# Patient Record
Sex: MALE | Race: WHITE
[De-identification: names, ages, dates, MRNs, and addresses within clinical notes are randomized per-mention and may not be internally consistent; named-entity substitution may affect disease eponyms.]

---

## 2018-05-25 ENCOUNTER — HOSPITAL ENCOUNTER (EMERGENCY)
Dept: HOSPITAL 45 - C.EDC | Age: 68
Discharge: ANOTHER HOSPITAL | End: 2018-05-25
Payer: COMMERCIAL

## 2018-05-25 VITALS — TEMPERATURE: 97.88 F

## 2018-05-25 VITALS — OXYGEN SATURATION: 97 %

## 2018-05-25 VITALS
HEIGHT: 70.98 IN | HEIGHT: 70.98 IN | BODY MASS INDEX: 25.96 KG/M2 | WEIGHT: 185.41 LBS | WEIGHT: 185.41 LBS | BODY MASS INDEX: 25.96 KG/M2

## 2018-05-25 VITALS — SYSTOLIC BLOOD PRESSURE: 190 MMHG | DIASTOLIC BLOOD PRESSURE: 96 MMHG | HEART RATE: 90 BPM | OXYGEN SATURATION: 100 %

## 2018-05-25 DIAGNOSIS — I72.6: Primary | ICD-10-CM

## 2018-05-25 DIAGNOSIS — G20: ICD-10-CM

## 2018-05-25 DIAGNOSIS — R55: ICD-10-CM

## 2018-05-25 DIAGNOSIS — R03.0: ICD-10-CM

## 2018-05-25 DIAGNOSIS — M79.671: ICD-10-CM

## 2018-05-25 LAB
ALBUMIN SERPL-MCNC: 3.6 GM/DL (ref 3.4–5)
ALP SERPL-CCNC: 68 U/L (ref 45–117)
ALT SERPL-CCNC: 12 U/L (ref 12–78)
AST SERPL-CCNC: 21 U/L (ref 15–37)
BASOPHILS # BLD: 0.01 K/UL (ref 0–0.2)
BASOPHILS NFR BLD: 0.2 %
BUN SERPL-MCNC: 17 MG/DL (ref 7–18)
CALCIUM SERPL-MCNC: 8.4 MG/DL (ref 8.5–10.1)
CK MB SERPL-MCNC: 2.6 NG/ML (ref 0.5–3.6)
CO2 SERPL-SCNC: 27 MMOL/L (ref 21–32)
CREAT SERPL-MCNC: 1.33 MG/DL (ref 0.6–1.4)
EOS ABS #: 0.09 K/UL (ref 0–0.5)
EOSINOPHIL NFR BLD AUTO: 188 K/UL (ref 130–400)
GLUCOSE SERPL-MCNC: 91 MG/DL (ref 70–99)
HCT VFR BLD CALC: 42.2 % (ref 42–52)
HGB BLD-MCNC: 14.7 G/DL (ref 14–18)
IG#: 0.01 K/UL (ref 0–0.02)
IMM GRANULOCYTES NFR BLD AUTO: 21.4 %
INR PPP: 1 (ref 0.9–1.1)
LIPASE: 138 U/L (ref 73–393)
LYMPHOCYTES # BLD: 1.04 K/UL (ref 1.2–3.4)
MCH RBC QN AUTO: 31.7 PG (ref 25–34)
MCHC RBC AUTO-ENTMCNC: 34.8 G/DL (ref 32–36)
MCV RBC AUTO: 90.9 FL (ref 80–100)
MONO ABS #: 0.55 K/UL (ref 0.11–0.59)
MONOCYTES NFR BLD: 11.3 %
NEUT ABS #: 3.16 K/UL (ref 1.4–6.5)
NEUTROPHILS # BLD AUTO: 1.9 %
NEUTROPHILS NFR BLD AUTO: 65 %
PMV BLD AUTO: 9.7 FL (ref 7.4–10.4)
POTASSIUM SERPL-SCNC: 3.4 MMOL/L (ref 3.5–5.1)
PROT SERPL-MCNC: 6.7 GM/DL (ref 6.4–8.2)
PTT PATIENT: 21.8 SECONDS (ref 21–31)
RED CELL DISTRIBUTION WIDTH CV: 13.1 % (ref 11.5–14.5)
RED CELL DISTRIBUTION WIDTH SD: 43.4 FL (ref 36.4–46.3)
SODIUM SERPL-SCNC: 141 MMOL/L (ref 136–145)
WBC # BLD AUTO: 4.86 K/UL (ref 4.8–10.8)

## 2018-05-25 NOTE — DIAGNOSTIC IMAGING REPORT
SINGLE VIEW CHEST



CLINICAL HISTORY:  Atypical chest pain.



FINDINGS: 2 AP, portable, upright chest radiographs are obtained. No prior

studies are available for comparison at the time of dictation. The examination

is degraded by portable technique and patient rotation.  The cardiomediastinal

silhouette is unremarkable. There is mild atherosclerotic calcification of the

thoracic aorta. There is elevation of left hemidiaphragm. No airspace

consolidation or pleural effusion is identified. No pneumothorax is seen. The

bony thorax is grossly intact.



IMPRESSION: No acute cardiopulmonary abnormality.







Electronically signed by:  Jesus Morgan M.D.

5/25/2018 3:51 PM



Dictated Date/Time:  5/25/2018 3:50 PM

## 2018-05-25 NOTE — DIAGNOSTIC IMAGING REPORT
CT OF THE HEAD WITHOUT CONTRAST



CLINICAL HISTORY: Syncope.    



COMPARISON STUDY:  No previous studies for comparison. 



TECHNIQUE: Helical axial images of the head were obtained without IV contrast.

Automated exposure control was utilized for the study.  A dose lowering

technique was utilized adhering to the principles of ALARA.





FINDINGS: No acute intracranial hemorrhage, midline shift or mass effect is

present. Note is made of a 1.6 x 1.4 cm round hyperdensity within the left

inferior posterior fossa, adjacent to or extending into the left foramen of

Luschka. This has minimal peripheral calcification. There are no findings to

suggest acute dural sinus thrombosis or acute territorial infarct. Ventricular

system is normal. There are no extra axial collections. There is no calvarial

fracture. Minimal mucosal thickening of the sinuses is noted.



IMPRESSION:  1.6 x 1.4 cm round hyperdensity within the left inferior posterior

fossa, adjacent to or extending into the left foramen of Luschka. A meningioma

is favored. An aneurysm could appear similar although is considered less likely.

A CTA of the head is recommended. Findings discussed with Dr. Ignacio at time of

dictation.







Electronically signed by:  Ronnie Middleton M.D.

5/25/2018 4:26 PM



Dictated Date/Time:  5/25/2018 4:18 PM

## 2018-05-25 NOTE — EMERGENCY ROOM VISIT NOTE
History


Report prepared by Jazmyne:  Татьяна Ramirez


Under the Supervision of:  Dr. Edson Ignacio M.D.


First contact with patient:  15:09


Chief Complaint:  SYNCOPE (NEAR SYNCOPE)


Stated Complaint:  SYNCOPE/CONFUSION


Nursing Triage Summary:  


[place] [address] for syncope with drooling, slumped over, 


then became unresponsive, CPR started by family, [facility] arrived on scene and 


confirmed pt not in cardiac arrest. Pt alert and oriented at this time, no 


neurological deficits noted, vitals stable, family at bedside.





History of Present Illness


The patient is a 67 year old male who presents to the Emergency Room with 

complaints of an episode of syncope 30-45 minutes ago. The patient is traveling 

with his family. They left this morning. He was a passenger in the car. He 

complained of nausea and then became unresponsive. He was clammy and 

diaphoretic. His eyes were rolled back and he was drooling. He did not appear 

to be seizing. The patient does not remember feeling sick. He has never had 

this before. His family member thought that he was not breathing and started 

CPR for 5-10 minutes before police arrived. He presents to the ED by EMS. He 

currently just feels tired. He denies any headache, chest pain, SOB, abdominal 

pain, black or bloody stool, incontinence, blurry vision, or tongue bite. He 

has been eating and drinking well. He has a history of Parkinson's. He denies 

any history of heart problems, seizure, or diabetes. He is not on blood 

thinners.





   Source of History:  patient, family


   Onset:  30-45 minutes ago


   Position:  head


   Quality:  other (syncope)


   Timing:  other (episodic)


   Associated Symptoms:  + diaphoresis, + nausea, + fatigue, No headache, No 

chest pain, No SOB, No abdominal pain, No melena, No hematochezia





Review of Systems


See HPI for pertinent positives & negatives. A total of 10 systems reviewed and 

were otherwise negative.





Past Medical & Surgical


Medical Problems:


(1) Parkinson's disease





Old medical records were attempted to be reviewed but there are no old records 

at this hospital. Nurse's notes were reviewed and I agree with.





Family History


No pertinent family history stated.





Social History


Smoking Status:  Never Smoker


Marital Status:  


Housing Status:  lives with family


Occupation Status:  retired





Current/Historical Medications


Scheduled


Aspirin (Aspirin Ec), 81 MG PO QAM


Carbidopa/Levodopa (Sinemet 25MG/250MG), 1 TAB PO TID


Coenzyme Q10 (Ubidecarenone) (Co Q10), 1 CAP PO DAILY


Ranitidine Hcl (Zantac), 300 MG PO HS


Rasagiline Mesylate (Azilect), 1 MG PO QAM





Allergies


Coded Allergies:  


     No Known Allergies (Unverified , 5/25/18)





Physical Exam


Vital Signs











  Date Time  Temp Pulse Resp B/P (MAP) Pulse Ox O2 Delivery O2 Flow Rate FiO2


 


5/25/18 18:54  90 20 139/89 99 Room Air  


 


5/25/18 17:06  93 14 148/87 99 Room Air  


 


5/25/18 15:09  82      


 


5/25/18 15:03 36.6 75 16 145/89 97 Room Air  


 


5/25/18 14:57     97 Room Air  











Physical Exam


General: Non-ill appearing older male in no acute distress, alert and oriented 

x3, answers all questions appropriately. 


HEENT: Normal cephalic atraumatic.  Pupils are equal round and reactive to 

light.  Extraocular movements are intact.  Oropharynx is pink with moist mucous 

membranes.  No swelling of the mouth lips or tongue.


Neck: Supple with a midline trachea.  No meningeal signs or stiffness, no JVD 

or bruits. No Stridor.


Chest: Clear to auscultation bilaterally.  No wheezes or rhonchi.  No increased 

work of breathing.


Heart: regular rate and rhythm. 


Abdomen: Soft nontender, nondistended without rebound guarding or rigidity.  


Extremities: No cyanosis clubbing or edema. No calf tenderness or assymetry


Spine/Back. Non tender to palpation. No CVA tenderness


Skin: Good turgor without rashes.


Neurologic exam: Cranial nerves two through 12 are intact.  Mild tremor from 

baseline Parkinson's. Otherwise, motor and sensation are intact and symmetrical 

throughout.





Medical Decision & Procedures


ER Provider


Diagnostic Interpretation:


X-ray results as stated below per interpretation by me and the radiologist. 

Radiology results as stated below per my review and radiologist interpretation:





SINGLE VIEW CHEST





CLINICAL HISTORY:  Atypical chest pain.





FINDINGS: 2 AP, portable, upright chest radiographs are obtained. No prior


studies are available for comparison at the time of dictation. The examination


is degraded by portable technique and patient rotation.  The cardiomediastinal


silhouette is unremarkable. There is mild atherosclerotic calcification of the


thoracic aorta. There is elevation of left hemidiaphragm. No airspace


consolidation or pleural effusion is identified. No pneumothorax is seen. The


bony thorax is grossly intact.





IMPRESSION: No acute cardiopulmonary abnormality.





Electronically signed by:  Jesus Morgan M.D.


5/25/2018 3:51 PM





Dictated Date/Time:  5/25/2018 3:50 PM








R FOOT MIN 3 VIEWS ROUTINE





CLINICAL HISTORY: Lateral right foot pain.    





COMPARISON: None





FINDINGS:  Tarsometatarsal joints are intact. No acute fracture is identified.


Incidental note is made of a bipartite lateral sesamoid of the right great toe.


Soft tissue swelling along the lateral aspect of the right fifth


metatarsophalangeal joint is noted. No acute fractures identified. There is mild


osteoarthritis within the right first metatarsophalangeal joint.





IMPRESSION: 





1. No acute fracture or dislocation within the right foot.





2. Soft tissue swelling along the lateral aspect of the right fifth


metatarsophalangeal joint.





Electronically signed by:  Ronnie Middleton M.D.


5/25/2018 5:39 PM





Dictated Date/Time:  5/25/2018 5:36 PM








CT OF THE HEAD WITHOUT CONTRAST





CLINICAL HISTORY: Syncope.    





COMPARISON STUDY:  No previous studies for comparison. 





TECHNIQUE: Helical axial images of the head were obtained without IV contrast.


Automated exposure control was utilized for the study.  A dose lowering


technique was utilized adhering to the principles of ALARA.








FINDINGS: No acute intracranial hemorrhage, midline shift or mass effect is


present. Note is made of a 1.6 x 1.4 cm round hyperdensity within the left


inferior posterior fossa, adjacent to or extending into the left foramen of


Luschka. This has minimal peripheral calcification. There are no findings to


suggest acute dural sinus thrombosis or acute territorial infarct. Ventricular


system is normal. There are no extra axial collections. There is no calvarial


fracture. Minimal mucosal thickening of the sinuses is noted.





IMPRESSION:  1.6 x 1.4 cm round hyperdensity within the left inferior posterior


fossa, adjacent to or extending into the left foramen of Luschka. A meningioma


is favored. An aneurysm could appear similar although is considered less likely.


A CTA of the head is recommended. Findings discussed with Dr. Ignacio at time of


dictation.





Electronically signed by:  Ronnie Middleton M.D.


5/25/2018 4:26 PM





Dictated Date/Time:  5/25/2018 4:18 PM








CTA ANGIOGRAPHY OF THE HEAD





CLINICAL HISTORY: Syncope. Posterior fossa abnormality on CT.    





COMPARISON STUDY:  Head CT May 25, 2018 at 4:17 PM.





TECHNIQUE:  Helical axial images of the head were obtained following uneventful


intravenous administration of 116 cc of Optiray 320.  A dose lowering technique


was utilized adhering to the principles of ALARA.








CT DOSE: 122.00 mGy.cm





FINDINGS: No acute intracranial hemorrhage, midline shift or mass effect is


present. Ventricular system is normal. The basilar cisterns are patent. There


are no extra-axial collections. The bilateral M1, M2, A1 and A2 segments are


patent. Note is made of a 1.6 x 1.4 cm round density within the left inferior


posterior fossa which arises from the intracranial portion of the left vertebral


artery, just distal to the takeoff of the posterior inferior cerebellar artery


which corresponds to the finding shown on prior contrast head CT. This


demonstrates minimal filling and represents a partially thrombosed aneurysm


which extends into or adjacent to the left foramen of Luschka. The periphery of


this aneurysm fills with contrast. The remainder of the aneurysm is thrombosed.


No additional intracranial aneurysms are identified. The posterior circulation


is otherwise unremarkable. There is mild intracranial vascular calcification.





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


portion of the left vertebral artery, immediately distal to the takeoff of the


left PICA. The periphery of this aneurysm fills with contrast while the majority


of the aneurysm is thrombosed. No subarachnoid hemorrhage. No additional


intracranial aneurysms. Neurosurgical consultation is recommended. Findings


discussed with Dr. Ignacio at time of dictation.





Electronically signed by:  Ronnie Middleton M.D.


5/25/2018 5:20 PM





Dictated Date/Time:  5/25/2018 5:01 PM





Laboratory Results


5/25/18 14:28








Red Blood Count 4.64, Mean Corpuscular Volume 90.9, Mean Corpuscular Hemoglobin 

31.7, Mean Corpuscular Hemoglobin Concent 34.8, Mean Platelet Volume 9.7, 

Neutrophils (%) (Auto) 65.0, Lymphocytes (%) (Auto) 21.4, Monocytes (%) (Auto) 

11.3, Eosinophils (%) (Auto) 1.9, Basophils (%) (Auto) 0.2, Neutrophils # (Auto

) 3.16, Lymphocytes # (Auto) 1.04, Monocytes # (Auto) 0.55, Eosinophils # (Auto

) 0.09, Basophils # (Auto) 0.01





5/25/18 14:28

















Test


  5/25/18


14:28 5/25/18


15:21 5/25/18


15:36


 


White Blood Count


  4.86 K/uL


(4.8-10.8) 


  


 


 


Red Blood Count


  4.64 M/uL


(4.7-6.1) 


  


 


 


Hemoglobin


  14.7 g/dL


(14.0-18.0) 


  


 


 


Hematocrit 42.2 % (42-52)   


 


Mean Corpuscular Volume


  90.9 fL


() 


  


 


 


Mean Corpuscular Hemoglobin


  31.7 pg


(25-34) 


  


 


 


Mean Corpuscular Hemoglobin


Concent 34.8 g/dl


(32-36) 


  


 


 


Platelet Count


  188 K/uL


(130-400) 


  


 


 


Mean Platelet Volume


  9.7 fL


(7.4-10.4) 


  


 


 


Neutrophils (%) (Auto) 65.0 %   


 


Lymphocytes (%) (Auto) 21.4 %   


 


Monocytes (%) (Auto) 11.3 %   


 


Eosinophils (%) (Auto) 1.9 %   


 


Basophils (%) (Auto) 0.2 %   


 


Neutrophils # (Auto)


  3.16 K/uL


(1.4-6.5) 


  


 


 


Lymphocytes # (Auto)


  1.04 K/uL


(1.2-3.4) 


  


 


 


Monocytes # (Auto)


  0.55 K/uL


(0.11-0.59) 


  


 


 


Eosinophils # (Auto)


  0.09 K/uL


(0-0.5) 


  


 


 


Basophils # (Auto)


  0.01 K/uL


(0-0.2) 


  


 


 


RDW Standard Deviation


  43.4 fL


(36.4-46.3) 


  


 


 


RDW Coefficient of Variation


  13.1 %


(11.5-14.5) 


  


 


 


Immature Granulocyte % (Auto) 0.2 %   


 


Immature Granulocyte # (Auto)


  0.01 K/uL


(0.00-0.02) 


  


 


 


Prothrombin Time


  10.4 SECONDS


(9.0-12.0) 


  


 


 


Prothromb Time International


Ratio 1.0 (0.9-1.1) 


  


  


 


 


Activated Partial


Thromboplast Time 21.8 SECONDS


(21.0-31.0) 


  


 


 


Partial Thromboplastin Ratio 0.8   


 


Anion Gap


  6.0 mmol/L


(3-11) 


  


 


 


Est Creatinine Clear Calc


Drug Dose 55.8 ml/min 


  


  


 


 


Estimated GFR (


American) 62.8 


  


  


 


 


Estimated GFR (Non-


American 54.2 


  


  


 


 


BUN/Creatinine Ratio 13.0 (10-20)   


 


Calcium Level


  8.4 mg/dl


(8.5-10.1) 


  


 


 


Total Bilirubin


  0.4 mg/dl


(0.2-1) 


  


 


 


Direct Bilirubin


  0.1 mg/dl


(0-0.2) 


  


 


 


Aspartate Amino Transf


(AST/SGOT) 21 U/L (15-37) 


  


  


 


 


Alanine Aminotransferase


(ALT/SGPT) 12 U/L (12-78) 


  


  


 


 


Alkaline Phosphatase


  68 U/L


() 


  


 


 


Total Creatine Kinase


  103 U/L


() 


  


 


 


Creatine Kinase MB


  2.6 ng/ml


(0.5-3.6) 


  


 


 


Total Protein


  6.7 gm/dl


(6.4-8.2) 


  


 


 


Albumin


  3.6 gm/dl


(3.4-5.0) 


  


 


 


Lipase


  138 U/L


() 


  


 


 


Creatine Kinase MB Ratio   (0-3.0)  


 


Bedside Troponin I


  


  


  < 0.030 ng/ml


(0-0.045)





Laboratory studies as stated above per my review.





Medications Administered











 Medications


  (Trade)  Dose


 Ordered  Sig/Faith


 Route  Start Time


 Stop Time Status Last Admin


Dose Admin


 


 Sodium Chloride  1,000 ml @ 


 100 mls/hr  Q10H STAT


 IV  5/25/18 19:25


 5/26/18 05:24  5/25/18 19:25


100 MLS/HR











ECG Per My Interpretation


Indication:  syncope


Rate (beats per minute):  77


Rhythm:  normal sinus


Findings:  no acute ischemic change, no ectopy, other (LVH)


Comparison ECG Date:  no prior available





ED Course


1510: Past medical records reviewed. The patient was evaluated in room C3, and 

a complete history and physical examination were performed.





1605: I reevaluated the patient. He is resting comfortably. He asked for a foot 

X-ray because he injured it couple days ago. On examination, he has tenderness 

along the right lateral foot. 





1624: I spoke with Dr. Middleton of radiology. He informed me of the finding. 

He suggested CT with contrast.





1712: I spoke with Dr. Middleton again. He informed me of the findings.





1726: I reevaluated the patient. I updated him and family on the results. They 

want to go to [place] because the daughter lives there.  





1740: I reevaluated the patient. They are OK with going to [facility].





1800: I discussed the patient's case with Dr. Sue, [facility] 

neurosurgery. He has accepted the patient for transfer to their ER by ground. 





1917: Our ambulance will be taking the patient to [place]. It is currently 

on the way back from [facility].





Medical Decision


Differentials include, but are not limited to; syncope, arrhythmia, ACS, seizure

, intracranial process, electrolyte or metabolic abnormality.








This patient comes in after having a  syncopal episode and his son did CPR on 

him by in the police arrived he did have a pulse.  He has had no history of 

similar and is asymptomatic at present.  He was nauseated prior and does not 

have any other recollection of the events.  Certainly given his age, there is 

concern for arrhythmia.  Seizure would certainly be in the differential as well 

however I think that is less likely there is no witnessed seizure and he had no 

tongue biting or incontinence.  It also could have been a vasovagal spell.  

Multiple blood testing was obtained EKG was obtained he was placed on a cardiac 

monitor. he was hydrated with IV normal saline.  Head CT scan was obtained as 

well as chest x-ray.  His EKG does not suggest acute coronary syndrome or 

arrhythmia.  His troponin was negative.  He has no acute electrolyte or 

metabolic abnormalities.  Chest x-ray was unremarkable.  CAT scan of his head 

shows an abnormality posterior fossa concerning for either mass or aneurysm.  

In light of this, I did a CTA of his head after talking the radiologist.  He 

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 

left vertebral artery.  I suspect that this most likely is an incidental 

finding rather than causing his symptoms but it certainly needs to be addressed 

by a neurosurgeon.  Unfortunately, we do not have a neurosurgeon here.  

Additionally, we do not have any interventional radiology or any aneurysm 

specialist in the event this needs to be coiled or operated on.  The patient is 

wife are from [place] and I gave them options for transfer they wish to be 

transferred to [facility] as they have family in [place].  I 

called and talked to Dr. Sue, the neurosurgeon on-call, and he agrees 

with the transfer.  The patient was kept n.p.o. in the event that he would need 

a procedure this evening and we started IV fluids normal saline at 100 cc an 

hour.  He will be transferred by ambulance to [place] for neurosurgical 

evaluation.





Medication Reconcilliation


Current Medication List:  was personally reviewed by me





Blood Pressure Screening


Patient's blood pressure:  Elevated blood pressure


Referred





Consults


Time Called:  1745


Cons[facility]ing Physician[facility]  [facility]


Returned Call:  1800


I discussed the patient's case with him. He has accepted the patient for 

transfer to their ER by ground.





Impression





 Primary Impression:  


 Vertebral artery aneurysm


 Additional Impression:  


 Syncope





Scribe Attestation


The scribe's documentation has been prepared under my direction and personally 

reviewed by me in its entirety. I confirm that the note above accurately 

reflects all work, treatment, procedures, and medical decision making performed 

by me.





Departure Information


Dispostion


Transfer Acute Care Facility





Patient Instructions


My [facility]





Problem Qualifiers

## 2018-05-25 NOTE — DIAGNOSTIC IMAGING REPORT
CTA ANGIOGRAPHY OF THE HEAD



CLINICAL HISTORY: Syncope. Posterior fossa abnormality on CT.    



COMPARISON STUDY:  Head CT May 25, 2018 at 4:17 PM.



TECHNIQUE:  Helical axial images of the head were obtained following uneventful

intravenous administration of 116 cc of Optiray 320.  A dose lowering technique

was utilized adhering to the principles of ALARA.





CT DOSE: 122.00 mGy.cm



FINDINGS: No acute intracranial hemorrhage, midline shift or mass effect is

present. Ventricular system is normal. The basilar cisterns are patent. There

are no extra-axial collections. The bilateral M1, M2, A1 and A2 segments are

patent. Note is made of a 1.6 x 1.4 cm round density within the left inferior

posterior fossa which arises from the intracranial portion of the left vertebral

artery, just distal to the takeoff of the posterior inferior cerebellar artery

which corresponds to the finding shown on prior contrast head CT. This

demonstrates minimal filling and represents a partially thrombosed aneurysm

which extends into or adjacent to the left foramen of Luschka. The periphery of

this aneurysm fills with contrast. The remainder of the aneurysm is thrombosed.

No additional intracranial aneurysms are identified. The posterior circulation

is otherwise unremarkable. There is mild intracranial vascular calcification.



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

portion of the left vertebral artery, immediately distal to the takeoff of the

left PICA. The periphery of this aneurysm fills with contrast while the majority

of the aneurysm is thrombosed. No subarachnoid hemorrhage. No additional

intracranial aneurysms. Neurosurgical consultation is recommended. Findings

discussed with Dr. Ignacio at time of dictation.







Electronically signed by:  Ronnie Middleton M.D.

5/25/2018 5:20 PM



Dictated Date/Time:  5/25/2018 5:01 PM